# Patient Record
Sex: FEMALE | Race: WHITE | NOT HISPANIC OR LATINO | Employment: PART TIME | URBAN - METROPOLITAN AREA
[De-identification: names, ages, dates, MRNs, and addresses within clinical notes are randomized per-mention and may not be internally consistent; named-entity substitution may affect disease eponyms.]

---

## 2017-10-12 ENCOUNTER — OFFICE VISIT (OUTPATIENT)
Dept: MEDICAL GROUP | Facility: MEDICAL CENTER | Age: 32
End: 2017-10-12
Payer: COMMERCIAL

## 2017-10-12 VITALS
HEIGHT: 67 IN | SYSTOLIC BLOOD PRESSURE: 104 MMHG | RESPIRATION RATE: 14 BRPM | HEART RATE: 80 BPM | OXYGEN SATURATION: 95 % | DIASTOLIC BLOOD PRESSURE: 68 MMHG | BODY MASS INDEX: 26.09 KG/M2 | TEMPERATURE: 98.3 F | WEIGHT: 166.2 LBS

## 2017-10-12 DIAGNOSIS — R05.9 COUGH: ICD-10-CM

## 2017-10-12 DIAGNOSIS — Z76.89 ENCOUNTER TO ESTABLISH CARE: ICD-10-CM

## 2017-10-12 PROCEDURE — 99204 OFFICE O/P NEW MOD 45 MIN: CPT | Performed by: PHYSICIAN ASSISTANT

## 2017-10-12 RX ORDER — ASCORBIC ACID 500 MG
500 TABLET ORAL DAILY
COMMUNITY

## 2017-10-12 ASSESSMENT — PATIENT HEALTH QUESTIONNAIRE - PHQ9: CLINICAL INTERPRETATION OF PHQ2 SCORE: 0

## 2017-10-12 NOTE — PROGRESS NOTES
"Subjective:   Marie Clark is a 31 y.o. female here today for establishing care for a cough for one month.    Cough  This is a 31-year-old female who is here today complaining of a one-month history of cough. She also has mucus production. Sometimes the cough will cause some chest discomfort. She denies any fevers. No shortness of breath. She is taking over-the-counter cold medications such as Sudafed and Mucinex. Last night when she was out her cough got worse. Typically she will wake up and after sometime will start coughing. Coughing is not too bad at nighttime. No known sick contacts. No previous medical condition. No previous surgeries. No medications other than cold medications and a vitamin C.       Current medicines (including changes today)  Current Outpatient Prescriptions   Medication Sig Dispense Refill   • ascorbic acid (ASCORBIC ACID) 500 MG Tab Take 500 mg by mouth every day.       No current facility-administered medications for this visit.      She  has no past medical history on file.    ROS   No chest pain, no shortness of breath, no abdominal pain and all other systems were reviewed and are negative.       Objective:     Blood pressure 104/68, pulse 80, temperature 36.8 °C (98.3 °F), resp. rate 14, height 1.702 m (5' 7\"), weight 75.4 kg (166 lb 3.2 oz), last menstrual period 10/05/2017, SpO2 95 %, not currently breastfeeding. Body mass index is 26.03 kg/m².   Physical Exam:  Constitutional: Alert, no distress.  Skin: Warm, dry, good turgor, no rashes in visible areas.  Eye: Equal, round and reactive, conjunctiva clear, lids normal.  ENMT: Lips without lesions, good dentition, oropharynx clear.  Neck: Trachea midline, no masses.   Lymph: No cervical or supraclavicular lymphadenopathy  Respiratory: Unlabored respiratory effort, lungs clear to auscultation, no wheezes, no ronchi.  Cardiovascular: Normal S1, S2, no murmur, no edema.  Abdomen: Soft, non-tender, no masses.  Psych: Alert and oriented " x3, normal affect and mood.        Assessment and Plan:   The following treatment plan was discussed    1. Cough  Acute, new onset condition. Symptoms likely a resolving viral process. May continue Mucinex and Sudafed as needed. Advised taking a long-acting antihistamine such as Claritin daily. May consider Flonase if nasal congestion were more severe. Expect symptoms to resolve in time. Advised to contact me or follow up with any worsening symptoms such as fevers, shortness of breath or chest pain.    2. Encounter to establish care      Followup: Return if symptoms worsen or fail to improve.    Please note that this dictation was created using voice recognition software. I have made every reasonable attempt to correct obvious errors, but I expect that there are errors of grammar and possibly content that I did not discover before finalizing the note.

## 2017-10-12 NOTE — ASSESSMENT & PLAN NOTE
This is a 31-year-old female who is here today complaining of a one-month history of cough. She also has mucus production. Sometimes the cough will cause some chest discomfort. She denies any fevers. No shortness of breath. She is taking over-the-counter cold medications such as Sudafed and Mucinex. Last night when she was out her cough got worse. Typically she will wake up and after sometime will start coughing. Coughing is not too bad at nighttime. No known sick contacts. No previous medical condition. No previous surgeries. No medications other than cold medications and a vitamin C.

## 2024-04-29 SDOH — HEALTH STABILITY: PHYSICAL HEALTH: ON AVERAGE, HOW MANY MINUTES DO YOU ENGAGE IN EXERCISE AT THIS LEVEL?: 30 MIN

## 2024-04-29 SDOH — ECONOMIC STABILITY: HOUSING INSECURITY: IN THE LAST 12 MONTHS, HOW MANY PLACES HAVE YOU LIVED?: 1

## 2024-04-29 SDOH — ECONOMIC STABILITY: HOUSING INSECURITY
IN THE LAST 12 MONTHS, WAS THERE A TIME WHEN YOU DID NOT HAVE A STEADY PLACE TO SLEEP OR SLEPT IN A SHELTER (INCLUDING NOW)?: NO

## 2024-04-29 SDOH — HEALTH STABILITY: PHYSICAL HEALTH: ON AVERAGE, HOW MANY DAYS PER WEEK DO YOU ENGAGE IN MODERATE TO STRENUOUS EXERCISE (LIKE A BRISK WALK)?: 1 DAY

## 2024-04-29 SDOH — ECONOMIC STABILITY: TRANSPORTATION INSECURITY
IN THE PAST 12 MONTHS, HAS THE LACK OF TRANSPORTATION KEPT YOU FROM MEDICAL APPOINTMENTS OR FROM GETTING MEDICATIONS?: NO

## 2024-04-29 SDOH — ECONOMIC STABILITY: TRANSPORTATION INSECURITY
IN THE PAST 12 MONTHS, HAS LACK OF TRANSPORTATION KEPT YOU FROM MEETINGS, WORK, OR FROM GETTING THINGS NEEDED FOR DAILY LIVING?: NO

## 2024-04-29 SDOH — ECONOMIC STABILITY: TRANSPORTATION INSECURITY
IN THE PAST 12 MONTHS, HAS LACK OF RELIABLE TRANSPORTATION KEPT YOU FROM MEDICAL APPOINTMENTS, MEETINGS, WORK OR FROM GETTING THINGS NEEDED FOR DAILY LIVING?: NO

## 2024-04-29 SDOH — ECONOMIC STABILITY: FOOD INSECURITY: WITHIN THE PAST 12 MONTHS, YOU WORRIED THAT YOUR FOOD WOULD RUN OUT BEFORE YOU GOT MONEY TO BUY MORE.: NEVER TRUE

## 2024-04-29 SDOH — ECONOMIC STABILITY: INCOME INSECURITY: HOW HARD IS IT FOR YOU TO PAY FOR THE VERY BASICS LIKE FOOD, HOUSING, MEDICAL CARE, AND HEATING?: NOT VERY HARD

## 2024-04-29 SDOH — ECONOMIC STABILITY: FOOD INSECURITY: WITHIN THE PAST 12 MONTHS, THE FOOD YOU BOUGHT JUST DIDN'T LAST AND YOU DIDN'T HAVE MONEY TO GET MORE.: NEVER TRUE

## 2024-04-29 SDOH — ECONOMIC STABILITY: HOUSING INSECURITY
IN THE LAST 12 MONTHS, WAS THERE A TIME WHEN YOU DID NOT HAVE A STEADY PLACE TO SLEEP OR SLEPT IN A SHELTER (INCLUDING NOW)?: PATIENT DECLINED

## 2024-04-29 SDOH — HEALTH STABILITY: MENTAL HEALTH
STRESS IS WHEN SOMEONE FEELS TENSE, NERVOUS, ANXIOUS, OR CAN'T SLEEP AT NIGHT BECAUSE THEIR MIND IS TROUBLED. HOW STRESSED ARE YOU?: ONLY A LITTLE

## 2024-04-29 SDOH — ECONOMIC STABILITY: INCOME INSECURITY: IN THE LAST 12 MONTHS, WAS THERE A TIME WHEN YOU WERE NOT ABLE TO PAY THE MORTGAGE OR RENT ON TIME?: PATIENT DECLINED

## 2024-04-29 ASSESSMENT — LIFESTYLE VARIABLES
HOW OFTEN DO YOU HAVE A DRINK CONTAINING ALCOHOL: MONTHLY OR LESS
HOW OFTEN DO YOU HAVE SIX OR MORE DRINKS ON ONE OCCASION: NEVER
HOW MANY STANDARD DRINKS CONTAINING ALCOHOL DO YOU HAVE ON A TYPICAL DAY: 1 OR 2
AUDIT-C TOTAL SCORE: 1
SKIP TO QUESTIONS 9-10: 1

## 2024-04-29 ASSESSMENT — SOCIAL DETERMINANTS OF HEALTH (SDOH)
HOW OFTEN DO YOU HAVE A DRINK CONTAINING ALCOHOL: MONTHLY OR LESS
HOW HARD IS IT FOR YOU TO PAY FOR THE VERY BASICS LIKE FOOD, HOUSING, MEDICAL CARE, AND HEATING?: NOT VERY HARD
DO YOU BELONG TO ANY CLUBS OR ORGANIZATIONS SUCH AS CHURCH GROUPS UNIONS, FRATERNAL OR ATHLETIC GROUPS, OR SCHOOL GROUPS?: NO
IN A TYPICAL WEEK, HOW MANY TIMES DO YOU TALK ON THE PHONE WITH FAMILY, FRIENDS, OR NEIGHBORS?: ONCE A WEEK
HOW OFTEN DO YOU ATTENT MEETINGS OF THE CLUB OR ORGANIZATION YOU BELONG TO?: NEVER
HOW OFTEN DO YOU HAVE SIX OR MORE DRINKS ON ONE OCCASION: NEVER
HOW OFTEN DO YOU ATTENT MEETINGS OF THE CLUB OR ORGANIZATION YOU BELONG TO?: NEVER
HOW OFTEN DO YOU GET TOGETHER WITH FRIENDS OR RELATIVES?: ONCE A WEEK
HOW OFTEN DO YOU GET TOGETHER WITH FRIENDS OR RELATIVES?: ONCE A WEEK
WITHIN THE PAST 12 MONTHS, YOU WORRIED THAT YOUR FOOD WOULD RUN OUT BEFORE YOU GOT THE MONEY TO BUY MORE: NEVER TRUE
DO YOU BELONG TO ANY CLUBS OR ORGANIZATIONS SUCH AS CHURCH GROUPS UNIONS, FRATERNAL OR ATHLETIC GROUPS, OR SCHOOL GROUPS?: NO
HOW OFTEN DO YOU ATTEND CHURCH OR RELIGIOUS SERVICES?: NEVER
HOW OFTEN DO YOU ATTEND CHURCH OR RELIGIOUS SERVICES?: NEVER
HOW MANY DRINKS CONTAINING ALCOHOL DO YOU HAVE ON A TYPICAL DAY WHEN YOU ARE DRINKING: 1 OR 2
IN A TYPICAL WEEK, HOW MANY TIMES DO YOU TALK ON THE PHONE WITH FAMILY, FRIENDS, OR NEIGHBORS?: ONCE A WEEK

## 2024-05-02 ENCOUNTER — APPOINTMENT (OUTPATIENT)
Dept: MEDICAL GROUP | Facility: MEDICAL CENTER | Age: 39
End: 2024-05-02
Payer: COMMERCIAL

## 2024-05-02 VITALS
HEIGHT: 67 IN | BODY MASS INDEX: 28.47 KG/M2 | WEIGHT: 181.4 LBS | HEART RATE: 82 BPM | TEMPERATURE: 97.4 F | OXYGEN SATURATION: 98 % | DIASTOLIC BLOOD PRESSURE: 64 MMHG | SYSTOLIC BLOOD PRESSURE: 100 MMHG

## 2024-05-02 DIAGNOSIS — F41.1 GAD (GENERALIZED ANXIETY DISORDER): ICD-10-CM

## 2024-05-02 DIAGNOSIS — Z13.21 ENCOUNTER FOR VITAMIN DEFICIENCY SCREENING: ICD-10-CM

## 2024-05-02 DIAGNOSIS — F33.1 MODERATE EPISODE OF RECURRENT MAJOR DEPRESSIVE DISORDER (HCC): ICD-10-CM

## 2024-05-02 DIAGNOSIS — Z13.6 SCREENING FOR CARDIOVASCULAR CONDITION: ICD-10-CM

## 2024-05-02 DIAGNOSIS — Z23 NEED FOR VACCINATION: ICD-10-CM

## 2024-05-02 DIAGNOSIS — Z11.4 ENCOUNTER FOR SCREENING FOR HIV: ICD-10-CM

## 2024-05-02 DIAGNOSIS — Z11.59 NEED FOR HEPATITIS C SCREENING TEST: ICD-10-CM

## 2024-05-02 DIAGNOSIS — Z13.228 SCREENING FOR ENDOCRINE, METABOLIC AND IMMUNITY DISORDER: ICD-10-CM

## 2024-05-02 DIAGNOSIS — Z76.89 ENCOUNTER TO ESTABLISH CARE: ICD-10-CM

## 2024-05-02 DIAGNOSIS — Z13.29 SCREENING FOR ENDOCRINE, METABOLIC AND IMMUNITY DISORDER: ICD-10-CM

## 2024-05-02 DIAGNOSIS — Z13.0 SCREENING FOR ENDOCRINE, METABOLIC AND IMMUNITY DISORDER: ICD-10-CM

## 2024-05-02 PROBLEM — R05.9 COUGH: Status: RESOLVED | Noted: 2017-10-12 | Resolved: 2024-05-02

## 2024-05-02 PROBLEM — F32.9 MAJOR DEPRESSIVE DISORDER: Status: ACTIVE | Noted: 2024-05-02

## 2024-05-02 PROCEDURE — 90746 HEPB VACCINE 3 DOSE ADULT IM: CPT

## 2024-05-02 PROCEDURE — 99204 OFFICE O/P NEW MOD 45 MIN: CPT | Mod: 25

## 2024-05-02 PROCEDURE — 3074F SYST BP LT 130 MM HG: CPT

## 2024-05-02 PROCEDURE — 90471 IMMUNIZATION ADMIN: CPT

## 2024-05-02 PROCEDURE — 3078F DIAST BP <80 MM HG: CPT

## 2024-05-02 RX ORDER — CITALOPRAM HYDROBROMIDE 10 MG/1
10 TABLET ORAL DAILY
Qty: 30 TABLET | Refills: 11 | Status: SHIPPED | OUTPATIENT
Start: 2024-05-02

## 2024-05-02 ASSESSMENT — PATIENT HEALTH QUESTIONNAIRE - PHQ9
CLINICAL INTERPRETATION OF PHQ2 SCORE: 4
SUM OF ALL RESPONSES TO PHQ QUESTIONS 1-9: 13
5. POOR APPETITE OR OVEREATING: 1 - SEVERAL DAYS

## 2024-05-02 ASSESSMENT — ENCOUNTER SYMPTOMS
ORTHOPNEA: 0
COUGH: 0
SHORTNESS OF BREATH: 0
CHILLS: 0
PALPITATIONS: 0
FEVER: 0

## 2024-05-02 NOTE — PROGRESS NOTES
"Subjective:     Verbal consent was acquired by the patient to use [employer] ambient listening note generation during this visit {YES/NO:[id number]}   CC: Establish Care      HPI:   Marie is a 38 y.o. female who presents today for:    History of Present Illness       ***    Allergies: Aloe vera     Medications: No current outpatient medications on file.      ROS:  ROS***    Objective:     Exam:  /64   Pulse 82   Temp 36.3 °C (97.4 °F) (Temporal)   Ht 1.702 m (5' 7\")   Wt 82.3 kg (181 lb 6.4 oz)   SpO2 98%   BMI 28.41 kg/m²  Body mass index is 28.41 kg/m².    Physical Exam      Assessment & Plan:     Marie a 38 y.o. female with the following -     Assessment & Plan      1. Encounter to establish care    2. NAJMA (generalized anxiety disorder)    3. Moderate episode of recurrent major depressive disorder (HCC)    4. Screening for cardiovascular condition    5. Screening for endocrine, metabolic and immunity disorder    6. Need for hepatitis C screening test    7. Encounter for screening for HIV        Anticipatory guidance included the following: Patient counseled about skin care, diet, supplements, smoking, drugs/alcohol use, safe sex and exercise.     No follow-ups on file.    Please note that this dictation was created using voice recognition software. I have made every reasonable attempt to correct obvious errors, but I expect that there are errors of grammar and possibly content that I did not discover before finalizing the note.      I have placed the below orders and discussed them with an approved delegating provider.  The MA is performing the below orders under the direction of ***.    "

## 2024-05-02 NOTE — PROGRESS NOTES
"Subjective:     Verbal consent was acquired by the patient to use [employer] ambient listening note generation during this visit Yes    CC: Establish Care      HPI:   Marie is a 38 y.o. female who presents today for:    History of Present Illness  The patient presents for evaluation of multiple medical concerns.    Depression/ anxiety: The patient expresses a desire to be screened for depression, a condition she has been grappling with for approximately 5 years. She reports experiencing a lack of interest in activities on certain days, feelings of sadness, depression, and hopelessness nearly daily. She reports feeling fatigued and lacking energy on most days. She occasionally experiences suicidal ideation, but denies any active plan. She denies SI today. She has previously sought therapy, but found it unhelpful. Her family members are supportive of her condition. She identifies her symptoms as more depression than anxiety, and experiences social anxiety.              Allergies: Aloe vera     Medications:   Current Outpatient Medications:     citalopram (CELEXA) 10 MG tablet, Take 1 Tablet by mouth every day., Disp: 30 Tablet, Rfl: 11      ROS:  Review of Systems   Constitutional:  Negative for chills and fever.   Respiratory:  Negative for cough and shortness of breath.    Cardiovascular:  Negative for chest pain, palpitations, orthopnea and leg swelling.       Objective:     Exam:  /64   Pulse 82   Temp 36.3 °C (97.4 °F) (Temporal)   Ht 1.702 m (5' 7\")   Wt 82.3 kg (181 lb 6.4 oz)   SpO2 98%   BMI 28.41 kg/m²  Body mass index is 28.41 kg/m².    Physical Exam  Constitutional:       Appearance: Normal appearance.   Eyes:      Pupils: Pupils are equal, round, and reactive to light.   Cardiovascular:      Rate and Rhythm: Normal rate and regular rhythm.      Pulses: Normal pulses.      Heart sounds: Normal heart sounds.   Pulmonary:      Effort: Pulmonary effort is normal.      Breath sounds: Normal breath " sounds.   Abdominal:      General: Bowel sounds are normal.      Palpations: Abdomen is soft.   Neurological:      Mental Status: She is alert and oriented to person, place, and time.   Psychiatric:         Mood and Affect: Mood normal.         Behavior: Behavior normal.           Assessment & Plan:     Marie frias 38 y.o. female with the following -     1. Encounter to establish care  Stable    2. NAJMA (generalized anxiety disorder)  3. Moderate episode of recurrent major depressive disorder (HCC)  Chronic, unstable   The patient has expressed a desire to commence medication to manage her anxiety and depression. Additionally, she has expressed interest in establishing a therapist. The patient will be initiated on a regimen of Celexa 10 mg, to be taken once daily. She has been advised to schedule a follow-up appointment with a therapist to establish a relationship.  - Patient has been identified as having a positive depression screening. Appropriate orders and counseling have been given.  - citalopram (CELEXA) 10 MG tablet; Take 1 Tablet by mouth every day.  Dispense: 30 Tablet; Refill: 11  - Referral to Psychology    4. Screening for cardiovascular condition  - Lipid Profile; Future    5. Screening for endocrine, metabolic and immunity disorder  - TSH WITH REFLEX TO FT4; Future  - Comp Metabolic Panel; Future  - CBC WITHOUT DIFFERENTIAL; Future    6. Need for hepatitis C screening test  - HEP C VIRUS ANTIBODY; Future    7. Encounter for screening for HIV  - HIV AG/AB COMBO ASSAY SCREENING; Future    8. Encounter for vitamin deficiency screening  - VITAMIN D,25 HYDROXY (DEFICIENCY); Future    9. Need for vaccination  - Hepatitis B Vaccine Adult IM        Anticipatory guidance included the following: Patient counseled about skin care, diet, supplements, smoking, drugs/alcohol use, safe sex and exercise.     Return in about 6 weeks (around 6/13/2024) for F/U depression, F/U PAP.    Please note that this dictation was  created using voice recognition software. I have made every reasonable attempt to correct obvious errors, but I expect that there are errors of grammar and possibly content that I did not discover before finalizing the note.      I have placed the below orders and discussed them with an approved delegating provider.  The MA is performing the below orders under the direction of Dr. Diaz.

## 2024-06-04 ENCOUNTER — HOSPITAL ENCOUNTER (OUTPATIENT)
Dept: LAB | Facility: MEDICAL CENTER | Age: 39
End: 2024-06-04
Payer: COMMERCIAL

## 2024-06-04 DIAGNOSIS — Z11.4 ENCOUNTER FOR SCREENING FOR HIV: ICD-10-CM

## 2024-06-04 DIAGNOSIS — Z13.21 ENCOUNTER FOR VITAMIN DEFICIENCY SCREENING: ICD-10-CM

## 2024-06-04 DIAGNOSIS — Z13.6 SCREENING FOR CARDIOVASCULAR CONDITION: ICD-10-CM

## 2024-06-04 DIAGNOSIS — Z11.59 NEED FOR HEPATITIS C SCREENING TEST: ICD-10-CM

## 2024-06-04 DIAGNOSIS — Z13.0 SCREENING FOR ENDOCRINE, METABOLIC AND IMMUNITY DISORDER: ICD-10-CM

## 2024-06-04 DIAGNOSIS — Z13.29 SCREENING FOR ENDOCRINE, METABOLIC AND IMMUNITY DISORDER: ICD-10-CM

## 2024-06-04 DIAGNOSIS — Z13.228 SCREENING FOR ENDOCRINE, METABOLIC AND IMMUNITY DISORDER: ICD-10-CM

## 2024-06-04 LAB
25(OH)D3 SERPL-MCNC: 33 NG/ML (ref 30–100)
ALBUMIN SERPL BCP-MCNC: 4.5 G/DL (ref 3.2–4.9)
ALBUMIN/GLOB SERPL: 2 G/DL
ALP SERPL-CCNC: 49 U/L (ref 30–99)
ALT SERPL-CCNC: 22 U/L (ref 2–50)
ANION GAP SERPL CALC-SCNC: 12 MMOL/L (ref 7–16)
AST SERPL-CCNC: 16 U/L (ref 12–45)
BILIRUB SERPL-MCNC: 0.2 MG/DL (ref 0.1–1.5)
BUN SERPL-MCNC: 8 MG/DL (ref 8–22)
CALCIUM ALBUM COR SERPL-MCNC: 8.7 MG/DL (ref 8.5–10.5)
CALCIUM SERPL-MCNC: 9.1 MG/DL (ref 8.5–10.5)
CHLORIDE SERPL-SCNC: 105 MMOL/L (ref 96–112)
CHOLEST SERPL-MCNC: 218 MG/DL (ref 100–199)
CO2 SERPL-SCNC: 25 MMOL/L (ref 20–33)
CREAT SERPL-MCNC: 0.58 MG/DL (ref 0.5–1.4)
ERYTHROCYTE [DISTWIDTH] IN BLOOD BY AUTOMATED COUNT: 45.9 FL (ref 35.9–50)
GFR SERPLBLD CREATININE-BSD FMLA CKD-EPI: 117 ML/MIN/1.73 M 2
GLOBULIN SER CALC-MCNC: 2.3 G/DL (ref 1.9–3.5)
GLUCOSE SERPL-MCNC: 87 MG/DL (ref 65–99)
HCT VFR BLD AUTO: 39.8 % (ref 37–47)
HCV AB SER QL: NORMAL
HDLC SERPL-MCNC: 53 MG/DL
HGB BLD-MCNC: 12.6 G/DL (ref 12–16)
HIV 1+2 AB+HIV1 P24 AG SERPL QL IA: NORMAL
LDLC SERPL CALC-MCNC: 148 MG/DL
MCH RBC QN AUTO: 30 PG (ref 27–33)
MCHC RBC AUTO-ENTMCNC: 31.7 G/DL (ref 32.2–35.5)
MCV RBC AUTO: 94.8 FL (ref 81.4–97.8)
PLATELET # BLD AUTO: 219 K/UL (ref 164–446)
PMV BLD AUTO: 12.3 FL (ref 9–12.9)
POTASSIUM SERPL-SCNC: 4.1 MMOL/L (ref 3.6–5.5)
PROT SERPL-MCNC: 6.8 G/DL (ref 6–8.2)
RBC # BLD AUTO: 4.2 M/UL (ref 4.2–5.4)
SODIUM SERPL-SCNC: 142 MMOL/L (ref 135–145)
TRIGL SERPL-MCNC: 83 MG/DL (ref 0–149)
TSH SERPL DL<=0.005 MIU/L-ACNC: 1.33 UIU/ML (ref 0.38–5.33)
WBC # BLD AUTO: 4.6 K/UL (ref 4.8–10.8)

## 2024-06-04 PROCEDURE — 36415 COLL VENOUS BLD VENIPUNCTURE: CPT

## 2024-06-04 PROCEDURE — 87389 HIV-1 AG W/HIV-1&-2 AB AG IA: CPT

## 2024-06-04 PROCEDURE — 86803 HEPATITIS C AB TEST: CPT

## 2024-06-04 PROCEDURE — 80061 LIPID PANEL: CPT

## 2024-06-04 PROCEDURE — 85027 COMPLETE CBC AUTOMATED: CPT

## 2024-06-04 PROCEDURE — 80053 COMPREHEN METABOLIC PANEL: CPT

## 2024-06-04 PROCEDURE — 82306 VITAMIN D 25 HYDROXY: CPT

## 2024-06-04 PROCEDURE — 84443 ASSAY THYROID STIM HORMONE: CPT

## 2024-06-22 DIAGNOSIS — F41.1 GAD (GENERALIZED ANXIETY DISORDER): ICD-10-CM

## 2024-06-22 DIAGNOSIS — F33.1 MODERATE EPISODE OF RECURRENT MAJOR DEPRESSIVE DISORDER (HCC): ICD-10-CM

## 2024-06-23 SDOH — ECONOMIC STABILITY: HOUSING INSECURITY: IN THE LAST 12 MONTHS, HOW MANY PLACES HAVE YOU LIVED?: 1

## 2024-06-23 SDOH — ECONOMIC STABILITY: FOOD INSECURITY: WITHIN THE PAST 12 MONTHS, THE FOOD YOU BOUGHT JUST DIDN'T LAST AND YOU DIDN'T HAVE MONEY TO GET MORE.: NEVER TRUE

## 2024-06-23 SDOH — ECONOMIC STABILITY: INCOME INSECURITY: HOW HARD IS IT FOR YOU TO PAY FOR THE VERY BASICS LIKE FOOD, HOUSING, MEDICAL CARE, AND HEATING?: NOT VERY HARD

## 2024-06-23 SDOH — ECONOMIC STABILITY: FOOD INSECURITY: WITHIN THE PAST 12 MONTHS, YOU WORRIED THAT YOUR FOOD WOULD RUN OUT BEFORE YOU GOT MONEY TO BUY MORE.: NEVER TRUE

## 2024-06-23 SDOH — ECONOMIC STABILITY: INCOME INSECURITY: IN THE LAST 12 MONTHS, WAS THERE A TIME WHEN YOU WERE NOT ABLE TO PAY THE MORTGAGE OR RENT ON TIME?: NO

## 2024-06-23 SDOH — HEALTH STABILITY: PHYSICAL HEALTH: ON AVERAGE, HOW MANY MINUTES DO YOU ENGAGE IN EXERCISE AT THIS LEVEL?: 0 MIN

## 2024-06-23 SDOH — HEALTH STABILITY: PHYSICAL HEALTH: ON AVERAGE, HOW MANY DAYS PER WEEK DO YOU ENGAGE IN MODERATE TO STRENUOUS EXERCISE (LIKE A BRISK WALK)?: 0 DAYS

## 2024-06-23 ASSESSMENT — SOCIAL DETERMINANTS OF HEALTH (SDOH)
IN A TYPICAL WEEK, HOW MANY TIMES DO YOU TALK ON THE PHONE WITH FAMILY, FRIENDS, OR NEIGHBORS?: NEVER
HOW OFTEN DO YOU ATTENT MEETINGS OF THE CLUB OR ORGANIZATION YOU BELONG TO?: NEVER
DO YOU BELONG TO ANY CLUBS OR ORGANIZATIONS SUCH AS CHURCH GROUPS UNIONS, FRATERNAL OR ATHLETIC GROUPS, OR SCHOOL GROUPS?: NO
HOW OFTEN DO YOU GET TOGETHER WITH FRIENDS OR RELATIVES?: ONCE A WEEK
HOW OFTEN DO YOU ATTEND CHURCH OR RELIGIOUS SERVICES?: NEVER
HOW OFTEN DO YOU HAVE A DRINK CONTAINING ALCOHOL: MONTHLY OR LESS
HOW OFTEN DO YOU ATTENT MEETINGS OF THE CLUB OR ORGANIZATION YOU BELONG TO?: NEVER
DO YOU BELONG TO ANY CLUBS OR ORGANIZATIONS SUCH AS CHURCH GROUPS UNIONS, FRATERNAL OR ATHLETIC GROUPS, OR SCHOOL GROUPS?: NO
HOW OFTEN DO YOU ATTEND CHURCH OR RELIGIOUS SERVICES?: NEVER
HOW OFTEN DO YOU HAVE SIX OR MORE DRINKS ON ONE OCCASION: NEVER
IN THE PAST 12 MONTHS, HAS THE ELECTRIC, GAS, OIL, OR WATER COMPANY THREATENED TO SHUT OFF SERVICE IN YOUR HOME?: NO
HOW HARD IS IT FOR YOU TO PAY FOR THE VERY BASICS LIKE FOOD, HOUSING, MEDICAL CARE, AND HEATING?: NOT VERY HARD
HOW MANY DRINKS CONTAINING ALCOHOL DO YOU HAVE ON A TYPICAL DAY WHEN YOU ARE DRINKING: 1 OR 2
IN A TYPICAL WEEK, HOW MANY TIMES DO YOU TALK ON THE PHONE WITH FAMILY, FRIENDS, OR NEIGHBORS?: NEVER
HOW OFTEN DO YOU GET TOGETHER WITH FRIENDS OR RELATIVES?: ONCE A WEEK
WITHIN THE PAST 12 MONTHS, YOU WORRIED THAT YOUR FOOD WOULD RUN OUT BEFORE YOU GOT THE MONEY TO BUY MORE: NEVER TRUE

## 2024-06-23 ASSESSMENT — LIFESTYLE VARIABLES
AUDIT-C TOTAL SCORE: 1
HOW MANY STANDARD DRINKS CONTAINING ALCOHOL DO YOU HAVE ON A TYPICAL DAY: 1 OR 2
HOW OFTEN DO YOU HAVE SIX OR MORE DRINKS ON ONE OCCASION: NEVER
SKIP TO QUESTIONS 9-10: 1
HOW OFTEN DO YOU HAVE A DRINK CONTAINING ALCOHOL: MONTHLY OR LESS

## 2024-06-24 RX ORDER — CITALOPRAM HYDROBROMIDE 10 MG/1
10 TABLET ORAL DAILY
Qty: 30 TABLET | Refills: 11 | Status: SHIPPED | OUTPATIENT
Start: 2024-06-24 | End: 2024-06-26 | Stop reason: SDUPTHER

## 2024-06-24 NOTE — TELEPHONE ENCOUNTER
Received request via: Pharmacy    Was the patient seen in the last year in this department? Yes    Does the patient have an active prescription (recently filled or refills available) for medication(s) requested? No    Pharmacy Name: [id number] - [place] - [id number] [facility] [place]     Does the patient have [facility] Plus and need 100 day supply (blood pressure, diabetes and cholesterol meds only)? Patient does not have SCP

## 2024-06-26 ENCOUNTER — OFFICE VISIT (OUTPATIENT)
Dept: MEDICAL GROUP | Facility: MEDICAL CENTER | Age: 39
End: 2024-06-26
Payer: COMMERCIAL

## 2024-06-26 VITALS
BODY MASS INDEX: 28.88 KG/M2 | HEIGHT: 67 IN | OXYGEN SATURATION: 94 % | DIASTOLIC BLOOD PRESSURE: 62 MMHG | WEIGHT: 184 LBS | HEART RATE: 91 BPM | TEMPERATURE: 97.8 F | SYSTOLIC BLOOD PRESSURE: 114 MMHG

## 2024-06-26 DIAGNOSIS — F33.1 MODERATE EPISODE OF RECURRENT MAJOR DEPRESSIVE DISORDER (HCC): ICD-10-CM

## 2024-06-26 DIAGNOSIS — E78.5 DYSLIPIDEMIA: ICD-10-CM

## 2024-06-26 DIAGNOSIS — F41.1 GAD (GENERALIZED ANXIETY DISORDER): ICD-10-CM

## 2024-06-26 PROCEDURE — 3074F SYST BP LT 130 MM HG: CPT

## 2024-06-26 PROCEDURE — 99214 OFFICE O/P EST MOD 30 MIN: CPT

## 2024-06-26 PROCEDURE — 3078F DIAST BP <80 MM HG: CPT

## 2024-06-26 RX ORDER — CITALOPRAM 20 MG/1
20 TABLET ORAL DAILY
Qty: 90 TABLET | Refills: 1 | Status: SHIPPED | OUTPATIENT
Start: 2024-06-26

## 2024-06-26 ASSESSMENT — ENCOUNTER SYMPTOMS
ORTHOPNEA: 0
CHILLS: 0
PALPITATIONS: 0
FEVER: 0
COUGH: 0
SHORTNESS OF BREATH: 0

## 2024-06-26 ASSESSMENT — FIBROSIS 4 INDEX: FIB4 SCORE: 0.62

## 2024-06-26 NOTE — PROGRESS NOTES
"Subjective:     Verbal consent was acquired by the patient to use [employer] ambient listening note generation during this visit Yes    CC: Follow-Up      HPI:   Marie is a 38 y.o. female who presents today for:    History of Present Illness  The patient presents for evaluation of multiple medical concerns.    The patient expresses a desire to review her laboratory results and discuss the effectiveness of her Celexa medication. She reports satisfactory management of her symptoms, with no longer experiences the \"gloming thoughts\". She expresses a desire to adjust the dosage. She feels she could get more benefit from a higher dose. She reports no adverse effects or complications from the medication.    Recent blood work revealed elevated cholesterol. She is not on medication to manage her cholesterol.          Allergies: Aloe vera     Medications:   Current Outpatient Medications:     citalopram (CELEXA) 20 MG Tab, Take 1 Tablet by mouth every day., Disp: 90 Tablet, Rfl: 1      ROS:  Review of Systems   Constitutional:  Negative for chills and fever.   Respiratory:  Negative for cough and shortness of breath.    Cardiovascular:  Negative for chest pain, palpitations, orthopnea and leg swelling.       Objective:     Exam:  /62   Pulse 91   Temp 36.6 °C (97.8 °F) (Temporal)   Ht 1.702 m (5' 7\")   Wt 83.5 kg (184 lb)   SpO2 94%   BMI 28.82 kg/m²  Body mass index is 28.82 kg/m².    Physical Exam  Constitutional:       Appearance: Normal appearance.   Eyes:      Pupils: Pupils are equal, round, and reactive to light.   Pulmonary:      Effort: Pulmonary effort is normal.   Neurological:      Mental Status: She is alert and oriented to person, place, and time.   Psychiatric:         Mood and Affect: Mood normal. Affect is flat.         Behavior: Behavior normal.           Assessment & Plan:     Marie a 38 y.o. female with the following -     1. NAJMA (generalized anxiety disorder)  2. Moderate episode of recurrent " major depressive disorder (HCC)  Chronic, unstable- improving  The dosage of Celexa will be escalated to 20 mg daily. She is advised to take two 10 mg tablets of her remaining supply of Celexa. A prescription for a 3-month supply with one refill will be provided.  - citalopram (CELEXA) 20 MG Tab; Take 1 Tablet by mouth every day.  Dispense: 90 Tablet; Refill: 1    3. Dyslipidemia  Chronic, stable  Continue to work on diet and exercise to help with cholesterol management. No need for medication at this time.     Anticipatory guidance included the following: Patient counseled about skin care, diet, supplements, smoking, drugs/alcohol use, safe sex and exercise.     Return if symptoms worsen or fail to improve.    Please note that this dictation was created using voice recognition software. I have made every reasonable attempt to correct obvious errors, but I expect that there are errors of grammar and possibly content that I did not discover before finalizing the note.

## 2024-07-19 DIAGNOSIS — F41.1 GAD (GENERALIZED ANXIETY DISORDER): ICD-10-CM

## 2024-07-19 DIAGNOSIS — F33.1 MODERATE EPISODE OF RECURRENT MAJOR DEPRESSIVE DISORDER (HCC): ICD-10-CM

## 2024-07-19 RX ORDER — CITALOPRAM 20 MG/1
20 TABLET ORAL DAILY
Qty: 90 TABLET | Refills: 1 | Status: SHIPPED | OUTPATIENT
Start: 2024-07-19